# Patient Record
Sex: FEMALE | Race: WHITE | NOT HISPANIC OR LATINO | Employment: OTHER | URBAN - METROPOLITAN AREA
[De-identification: names, ages, dates, MRNs, and addresses within clinical notes are randomized per-mention and may not be internally consistent; named-entity substitution may affect disease eponyms.]

---

## 2017-09-01 ENCOUNTER — ESTABLISHED PATIENT (OUTPATIENT)
Dept: URBAN - METROPOLITAN AREA CLINIC 39 | Facility: CLINIC | Age: 82
End: 2017-09-01

## 2017-09-01 DIAGNOSIS — H00.012: ICD-10-CM

## 2017-09-01 PROCEDURE — 1036F TOBACCO NON-USER: CPT

## 2017-09-01 PROCEDURE — 92012 INTRM OPH EXAM EST PATIENT: CPT

## 2017-09-01 PROCEDURE — G8428 CUR MEDS NOT DOCUMENT: HCPCS

## 2017-09-01 PROCEDURE — G8785 BP SCRN NO PERF AT INTERVAL: HCPCS

## 2017-09-01 ASSESSMENT — TONOMETRY
OS_IOP_MMHG: 19
OD_IOP_MMHG: 18

## 2017-09-01 ASSESSMENT — VISUAL ACUITY
OS_SC: 20/25
OD_SC: 20/25+1

## 2017-10-12 ENCOUNTER — EST. PATIENT EMERGENCY (OUTPATIENT)
Dept: URBAN - METROPOLITAN AREA CLINIC 39 | Facility: CLINIC | Age: 82
End: 2017-10-12

## 2017-10-12 DIAGNOSIS — H00.012: ICD-10-CM

## 2017-10-12 PROCEDURE — 1036F TOBACCO NON-USER: CPT

## 2017-10-12 PROCEDURE — G8785 BP SCRN NO PERF AT INTERVAL: HCPCS

## 2017-10-12 PROCEDURE — G8428 CUR MEDS NOT DOCUMENT: HCPCS

## 2017-10-12 PROCEDURE — 99212 OFFICE O/P EST SF 10 MIN: CPT

## 2017-10-12 RX ORDER — DOXYCYCLINE HYCLATE 100MG 100 MG/1
1 CAPSULE ORAL TWICE A DAY
Start: 2017-10-12 | End: 2017-10-26

## 2017-10-12 ASSESSMENT — VISUAL ACUITY
OD_SC: 20/30-2
OS_SC: 20/25

## 2017-10-12 ASSESSMENT — TONOMETRY
OD_IOP_MMHG: 17
OS_IOP_MMHG: 16

## 2017-12-13 ENCOUNTER — PREPPED CHART (OUTPATIENT)
Dept: URBAN - METROPOLITAN AREA CLINIC 39 | Facility: CLINIC | Age: 82
End: 2017-12-13

## 2018-03-08 ENCOUNTER — ESTABLISHED PATIENT (OUTPATIENT)
Dept: URBAN - METROPOLITAN AREA CLINIC 39 | Facility: CLINIC | Age: 83
End: 2018-03-08

## 2018-03-08 VITALS
RESPIRATION RATE: 14 BRPM | DIASTOLIC BLOOD PRESSURE: 76 MMHG | HEIGHT: 55 IN | SYSTOLIC BLOOD PRESSURE: 118 MMHG | HEART RATE: 64 BPM

## 2018-03-08 DIAGNOSIS — H01.004: ICD-10-CM

## 2018-03-08 DIAGNOSIS — H40.053: ICD-10-CM

## 2018-03-08 DIAGNOSIS — H43.393: ICD-10-CM

## 2018-03-08 DIAGNOSIS — H01.001: ICD-10-CM

## 2018-03-08 PROCEDURE — 92015 DETERMINE REFRACTIVE STATE: CPT

## 2018-03-08 PROCEDURE — 92133 CPTRZD OPH DX IMG PST SGM ON: CPT

## 2018-03-08 PROCEDURE — 92014 COMPRE OPH EXAM EST PT 1/>: CPT

## 2018-03-08 PROCEDURE — G8783 BP SCRN PERF REC INTERVAL: HCPCS

## 2018-03-08 PROCEDURE — 1036F TOBACCO NON-USER: CPT

## 2018-03-08 PROCEDURE — 76514 ECHO EXAM OF EYE THICKNESS: CPT

## 2018-03-08 PROCEDURE — G8428 CUR MEDS NOT DOCUMENT: HCPCS

## 2018-03-08 ASSESSMENT — VISUAL ACUITY
OD_CC: J1
OS_CC: J1
OS_SC: J6
OD_SC: J6
OD_SC: 20/30
OS_SC: 20/30+2

## 2018-03-08 ASSESSMENT — TONOMETRY
OS_IOP_MMHG: 23
OD_IOP_MMHG: 24

## 2018-05-24 ENCOUNTER — EST. PATIENT EMERGENCY (OUTPATIENT)
Dept: URBAN - METROPOLITAN AREA CLINIC 39 | Facility: CLINIC | Age: 83
End: 2018-05-24

## 2018-05-24 DIAGNOSIS — H00.026: ICD-10-CM

## 2018-05-24 DIAGNOSIS — H01.001: ICD-10-CM

## 2018-05-24 DIAGNOSIS — H01.004: ICD-10-CM

## 2018-05-24 PROCEDURE — 1036F TOBACCO NON-USER: CPT

## 2018-05-24 PROCEDURE — G8785 BP SCRN NO PERF AT INTERVAL: HCPCS

## 2018-05-24 PROCEDURE — G8427 DOCREV CUR MEDS BY ELIG CLIN: HCPCS

## 2018-05-24 PROCEDURE — 4040F PNEUMOC VAC/ADMIN/RCVD: CPT

## 2018-05-24 PROCEDURE — 99213 OFFICE O/P EST LOW 20 MIN: CPT

## 2018-05-24 ASSESSMENT — TONOMETRY
OD_IOP_MMHG: 22
OS_IOP_MMHG: 21

## 2018-05-24 ASSESSMENT — VISUAL ACUITY
OD_SC: 20/30-1
OS_SC: 20/30-2

## 2018-06-14 ENCOUNTER — FOLLOW UP (OUTPATIENT)
Dept: URBAN - METROPOLITAN AREA CLINIC 39 | Facility: CLINIC | Age: 83
End: 2018-06-14

## 2018-06-14 DIAGNOSIS — H02.836: ICD-10-CM

## 2018-06-14 DIAGNOSIS — H01.004: ICD-10-CM

## 2018-06-14 DIAGNOSIS — H01.001: ICD-10-CM

## 2018-06-14 DIAGNOSIS — H00.026: ICD-10-CM

## 2018-06-14 PROCEDURE — 92012 INTRM OPH EXAM EST PATIENT: CPT

## 2018-06-14 PROCEDURE — G8427 DOCREV CUR MEDS BY ELIG CLIN: HCPCS

## 2018-06-14 PROCEDURE — G8785 BP SCRN NO PERF AT INTERVAL: HCPCS

## 2018-06-14 PROCEDURE — 1036F TOBACCO NON-USER: CPT

## 2018-06-14 RX ORDER — DOXYCYCLINE HYCLATE 50 MG/1: TABLET, FILM COATED ORAL

## 2018-06-14 ASSESSMENT — TONOMETRY
OS_IOP_MMHG: 20
OD_IOP_MMHG: 19

## 2018-06-14 ASSESSMENT — VISUAL ACUITY
OS_SC: 20/25-1
OD_SC: 20/30-1

## 2018-07-18 ENCOUNTER — CONSULT (OUTPATIENT)
Dept: URBAN - METROPOLITAN AREA CLINIC 39 | Facility: CLINIC | Age: 83
End: 2018-07-18

## 2018-07-18 DIAGNOSIS — H02.836: ICD-10-CM

## 2018-07-18 DIAGNOSIS — H02.833: ICD-10-CM

## 2018-07-18 PROCEDURE — 1036F TOBACCO NON-USER: CPT

## 2018-07-18 PROCEDURE — 4040F PNEUMOC VAC/ADMIN/RCVD: CPT

## 2018-07-18 PROCEDURE — 99213 OFFICE O/P EST LOW 20 MIN: CPT

## 2018-07-18 PROCEDURE — G8428 CUR MEDS NOT DOCUMENT: HCPCS

## 2018-07-18 PROCEDURE — 92285 EXTERNAL OCULAR PHOTOGRAPHY: CPT

## 2018-07-18 PROCEDURE — G8785 BP SCRN NO PERF AT INTERVAL: HCPCS

## 2018-07-18 ASSESSMENT — VISUAL ACUITY
OU_SC: 20/20-2
OD_SC: 20/25+2
OS_SC: 20/25-2

## 2018-07-23 ENCOUNTER — TECH ONLY (OUTPATIENT)
Dept: URBAN - METROPOLITAN AREA CLINIC 39 | Facility: CLINIC | Age: 83
End: 2018-07-23

## 2018-07-23 DIAGNOSIS — H02.833: ICD-10-CM

## 2018-07-23 DIAGNOSIS — H02.836: ICD-10-CM

## 2018-07-23 PROCEDURE — 92082 INTERMEDIATE VISUAL FIELD XM: CPT

## 2018-07-23 PROCEDURE — 99211T TECH SERVICE

## 2019-11-22 NOTE — PATIENT DISCUSSION
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

## 2020-02-28 NOTE — PATIENT DISCUSSION
Also, please do not hesitate to call us if you have any concerns not addressed by this information. Please call [phone] and we will do everything we can to help you during this period.

## 2020-03-25 ENCOUNTER — EST. PATIENT EMERGENCY (OUTPATIENT)
Dept: URBAN - METROPOLITAN AREA CLINIC 39 | Facility: CLINIC | Age: 85
End: 2020-03-25

## 2020-03-25 DIAGNOSIS — S05.02XA: ICD-10-CM

## 2020-03-25 DIAGNOSIS — H02.88B: ICD-10-CM

## 2020-03-25 DIAGNOSIS — H04.123: ICD-10-CM

## 2020-03-25 DIAGNOSIS — H02.88A: ICD-10-CM

## 2020-03-25 PROCEDURE — 92012 INTRM OPH EXAM EST PATIENT: CPT

## 2020-03-25 PROCEDURE — 92071 CONTACT LENS FITTING FOR TX: CPT

## 2020-03-25 RX ORDER — AMOXICILLIN 500 MG/1: 1 CAPSULE ORAL

## 2020-03-25 ASSESSMENT — TONOMETRY
OD_IOP_MMHG: 17
OS_IOP_MMHG: 17

## 2020-03-25 ASSESSMENT — VISUAL ACUITY
OD_SC: 20/25-1
OS_SC: 20/25-2

## 2020-03-30 ENCOUNTER — FOLLOW UP (OUTPATIENT)
Dept: URBAN - METROPOLITAN AREA CLINIC 39 | Facility: CLINIC | Age: 85
End: 2020-03-30

## 2020-03-30 DIAGNOSIS — H04.123: ICD-10-CM

## 2020-03-30 DIAGNOSIS — H01.02B: ICD-10-CM

## 2020-03-30 DIAGNOSIS — H01.02A: ICD-10-CM

## 2020-03-30 DIAGNOSIS — S05.02XD: ICD-10-CM

## 2020-03-30 DIAGNOSIS — H02.88A: ICD-10-CM

## 2020-03-30 DIAGNOSIS — H02.88B: ICD-10-CM

## 2020-03-30 PROCEDURE — 92012 INTRM OPH EXAM EST PATIENT: CPT

## 2020-03-30 ASSESSMENT — VISUAL ACUITY
OS_SC: 20/40+2
OD_SC: 20/30

## 2020-05-20 ENCOUNTER — ESTABLISHED COMPREHENSIVE EXAM (OUTPATIENT)
Dept: URBAN - METROPOLITAN AREA CLINIC 39 | Facility: CLINIC | Age: 85
End: 2020-05-20

## 2020-05-20 DIAGNOSIS — H02.88B: ICD-10-CM

## 2020-05-20 DIAGNOSIS — H43.813: ICD-10-CM

## 2020-05-20 DIAGNOSIS — H02.88A: ICD-10-CM

## 2020-05-20 DIAGNOSIS — H04.123: ICD-10-CM

## 2020-05-20 PROCEDURE — 92015 DETERMINE REFRACTIVE STATE: CPT

## 2020-05-20 PROCEDURE — 92014 COMPRE OPH EXAM EST PT 1/>: CPT

## 2020-05-20 ASSESSMENT — TONOMETRY
OS_IOP_MMHG: 12
OD_IOP_MMHG: 13

## 2020-05-20 ASSESSMENT — VISUAL ACUITY
OD_SC: J2
OS_SC: 20/30
OU_SC: J1
OU_SC: 20/25
OD_SC: 20/30+1
OS_SC: J3

## 2020-06-16 ENCOUNTER — CONSULT (OUTPATIENT)
Dept: URBAN - METROPOLITAN AREA CLINIC 39 | Facility: CLINIC | Age: 85
End: 2020-06-16

## 2020-06-16 VITALS — SYSTOLIC BLOOD PRESSURE: 167 MMHG | DIASTOLIC BLOOD PRESSURE: 76 MMHG | HEIGHT: 55 IN | HEART RATE: 64 BPM

## 2020-06-16 DIAGNOSIS — H43.813: ICD-10-CM

## 2020-06-16 DIAGNOSIS — H34.8322: ICD-10-CM

## 2020-06-16 DIAGNOSIS — H35.62: ICD-10-CM

## 2020-06-16 PROCEDURE — 92201 OPSCPY EXTND RTA DRAW UNI/BI: CPT

## 2020-06-16 PROCEDURE — 92134 CPTRZ OPH DX IMG PST SGM RTA: CPT

## 2020-06-16 PROCEDURE — 92014 COMPRE OPH EXAM EST PT 1/>: CPT

## 2020-06-16 ASSESSMENT — TONOMETRY
OD_IOP_MMHG: 14
OS_IOP_MMHG: 15

## 2020-06-16 ASSESSMENT — VISUAL ACUITY
OD_SC: 20/25-1
OD_SC: J3
OS_SC: J4
OS_SC: 20/25-2

## 2020-08-17 ENCOUNTER — RETINA CONSULT (OUTPATIENT)
Dept: URBAN - METROPOLITAN AREA CLINIC 39 | Facility: CLINIC | Age: 85
End: 2020-08-17

## 2020-08-17 DIAGNOSIS — H35.3131: ICD-10-CM

## 2020-08-17 DIAGNOSIS — H35.723: ICD-10-CM

## 2020-08-17 DIAGNOSIS — H34.8322: ICD-10-CM

## 2020-08-17 DIAGNOSIS — H35.372: ICD-10-CM

## 2020-08-17 DIAGNOSIS — H35.363: ICD-10-CM

## 2020-08-17 DIAGNOSIS — H43.813: ICD-10-CM

## 2020-08-17 PROCEDURE — 92014 COMPRE OPH EXAM EST PT 1/>: CPT

## 2020-08-17 PROCEDURE — 92134 CPTRZ OPH DX IMG PST SGM RTA: CPT

## 2020-08-17 PROCEDURE — 92273 FULL FIELD ERG W/I&R: CPT

## 2020-08-17 PROCEDURE — 92242 FLUORESCEIN&ICG ANGIOGRAPHY: CPT

## 2020-08-17 ASSESSMENT — VISUAL ACUITY
OS_SC: 20/50
OD_PH: 20/30-2
OD_SC: 20/40

## 2020-08-17 ASSESSMENT — TONOMETRY
OD_IOP_MMHG: 19
OS_IOP_MMHG: 19

## 2020-11-16 ENCOUNTER — ESTABLISHED COMPREHENSIVE EXAM (OUTPATIENT)
Dept: URBAN - METROPOLITAN AREA CLINIC 39 | Facility: CLINIC | Age: 85
End: 2020-11-16

## 2020-11-16 DIAGNOSIS — H35.372: ICD-10-CM

## 2020-11-16 DIAGNOSIS — H43.813: ICD-10-CM

## 2020-11-16 DIAGNOSIS — H35.3131: ICD-10-CM

## 2020-11-16 DIAGNOSIS — H35.363: ICD-10-CM

## 2020-11-16 DIAGNOSIS — H34.8322: ICD-10-CM

## 2020-11-16 DIAGNOSIS — H35.723: ICD-10-CM

## 2020-11-16 PROCEDURE — 92242 FLUORESCEIN&ICG ANGIOGRAPHY: CPT

## 2020-11-16 PROCEDURE — 92014 COMPRE OPH EXAM EST PT 1/>: CPT

## 2020-11-16 PROCEDURE — 92202 OPSCPY EXTND ON/MAC DRAW: CPT

## 2020-11-16 PROCEDURE — 92134 CPTRZ OPH DX IMG PST SGM RTA: CPT

## 2020-11-16 ASSESSMENT — VISUAL ACUITY
OD_PH: 20/30+2
OD_SC: 20/40+2
OS_SC: 20/40+1
OS_PH: 20/25-1

## 2020-11-16 ASSESSMENT — TONOMETRY
OS_IOP_MMHG: 20
OD_IOP_MMHG: 18

## 2021-04-19 ENCOUNTER — ESTABLISHED COMPREHENSIVE EXAM (OUTPATIENT)
Dept: URBAN - METROPOLITAN AREA CLINIC 39 | Facility: CLINIC | Age: 86
End: 2021-04-19

## 2021-04-19 DIAGNOSIS — H34.8322: ICD-10-CM

## 2021-04-19 DIAGNOSIS — H43.813: ICD-10-CM

## 2021-04-19 DIAGNOSIS — H35.372: ICD-10-CM

## 2021-04-19 DIAGNOSIS — H35.723: ICD-10-CM

## 2021-04-19 DIAGNOSIS — H35.363: ICD-10-CM

## 2021-04-19 DIAGNOSIS — H35.3131: ICD-10-CM

## 2021-04-19 PROCEDURE — 92242 FLUORESCEIN&ICG ANGIOGRAPHY: CPT

## 2021-04-19 PROCEDURE — 92273 FULL FIELD ERG W/I&R: CPT

## 2021-04-19 PROCEDURE — 92134 CPTRZ OPH DX IMG PST SGM RTA: CPT

## 2021-04-19 PROCEDURE — 99214 OFFICE O/P EST MOD 30 MIN: CPT

## 2021-04-19 PROCEDURE — 92202 OPSCPY EXTND ON/MAC DRAW: CPT

## 2021-04-19 ASSESSMENT — TONOMETRY
OS_IOP_MMHG: 16
OD_IOP_MMHG: 15

## 2021-04-19 ASSESSMENT — VISUAL ACUITY
OD_PH: 20/30-1
OD_SC: 20/40-2
OS_SC: 20/30-1

## 2021-10-20 ENCOUNTER — ESTABLISHED COMPREHENSIVE EXAM (OUTPATIENT)
Dept: URBAN - METROPOLITAN AREA CLINIC 39 | Facility: CLINIC | Age: 86
End: 2021-10-20

## 2021-10-20 DIAGNOSIS — H34.8322: ICD-10-CM

## 2021-10-20 DIAGNOSIS — H35.363: ICD-10-CM

## 2021-10-20 DIAGNOSIS — H02.88A: ICD-10-CM

## 2021-10-20 DIAGNOSIS — S05.02XD: ICD-10-CM

## 2021-10-20 DIAGNOSIS — H35.30: ICD-10-CM

## 2021-10-20 DIAGNOSIS — H01.02A: ICD-10-CM

## 2021-10-20 DIAGNOSIS — H35.3131: ICD-10-CM

## 2021-10-20 DIAGNOSIS — H01.02B: ICD-10-CM

## 2021-10-20 DIAGNOSIS — H43.813: ICD-10-CM

## 2021-10-20 DIAGNOSIS — H35.723: ICD-10-CM

## 2021-10-20 DIAGNOSIS — H35.372: ICD-10-CM

## 2021-10-20 DIAGNOSIS — H02.88B: ICD-10-CM

## 2021-10-20 DIAGNOSIS — H04.123: ICD-10-CM

## 2021-10-20 DIAGNOSIS — Z96.1: ICD-10-CM

## 2021-10-20 PROCEDURE — 92015 DETERMINE REFRACTIVE STATE: CPT

## 2021-10-20 PROCEDURE — 92014 COMPRE OPH EXAM EST PT 1/>: CPT

## 2021-10-20 ASSESSMENT — TONOMETRY
OD_IOP_MMHG: 16
OS_IOP_MMHG: 17

## 2021-10-20 ASSESSMENT — VISUAL ACUITY
OU_CC: J1
OS_SC: 20/50+2
OS_CC: J1+
OD_CC: J1
OU_SC: 20/40-1
OU_SC: J1
OD_SC: J2
OS_SC: J3
OD_SC: 20/50+2

## 2022-04-20 ENCOUNTER — COMPREHENSIVE EXAM (OUTPATIENT)
Dept: URBAN - METROPOLITAN AREA CLINIC 39 | Facility: CLINIC | Age: 87
End: 2022-04-20

## 2022-04-20 DIAGNOSIS — H35.3131: ICD-10-CM

## 2022-04-20 DIAGNOSIS — Z96.1: ICD-10-CM

## 2022-04-20 DIAGNOSIS — H43.813: ICD-10-CM

## 2022-04-20 DIAGNOSIS — H35.372: ICD-10-CM

## 2022-04-20 DIAGNOSIS — H35.363: ICD-10-CM

## 2022-04-20 DIAGNOSIS — H34.8322: ICD-10-CM

## 2022-04-20 DIAGNOSIS — H35.723: ICD-10-CM

## 2022-04-20 PROCEDURE — 92242 FLUORESCEIN&ICG ANGIOGRAPHY: CPT

## 2022-04-20 PROCEDURE — 92273 FULL FIELD ERG W/I&R: CPT

## 2022-04-20 PROCEDURE — 99214 OFFICE O/P EST MOD 30 MIN: CPT

## 2022-04-20 PROCEDURE — 92134 CPTRZ OPH DX IMG PST SGM RTA: CPT

## 2022-04-20 ASSESSMENT — TONOMETRY
OD_IOP_MMHG: 23
OS_IOP_MMHG: 21

## 2022-04-20 ASSESSMENT — VISUAL ACUITY
OD_PH: 20/30-2
OS_SC: 20/30
OD_SC: 20/40

## 2022-09-21 ENCOUNTER — COMPREHENSIVE EXAM (OUTPATIENT)
Dept: URBAN - METROPOLITAN AREA CLINIC 39 | Facility: CLINIC | Age: 87
End: 2022-09-21

## 2022-09-21 DIAGNOSIS — H35.363: ICD-10-CM

## 2022-09-21 DIAGNOSIS — H35.372: ICD-10-CM

## 2022-09-21 DIAGNOSIS — H04.123: ICD-10-CM

## 2022-09-21 DIAGNOSIS — H34.8322: ICD-10-CM

## 2022-09-21 DIAGNOSIS — H40.053: ICD-10-CM

## 2022-09-21 DIAGNOSIS — H35.723: ICD-10-CM

## 2022-09-21 DIAGNOSIS — H43.813: ICD-10-CM

## 2022-09-21 DIAGNOSIS — H35.3132: ICD-10-CM

## 2022-09-21 PROCEDURE — 99214 OFFICE O/P EST MOD 30 MIN: CPT

## 2022-09-21 PROCEDURE — 92250 FUNDUS PHOTOGRAPHY W/I&R: CPT

## 2022-09-21 PROCEDURE — 92273 FULL FIELD ERG W/I&R: CPT

## 2022-09-21 ASSESSMENT — TONOMETRY
OS_IOP_MMHG: 16
OD_IOP_MMHG: 16

## 2022-09-21 ASSESSMENT — VISUAL ACUITY
OD_CC: 20/25-1
OS_CC: 20/25

## 2022-12-27 ENCOUNTER — COMPREHENSIVE EXAM (OUTPATIENT)
Dept: URBAN - METROPOLITAN AREA CLINIC 39 | Facility: CLINIC | Age: 87
End: 2022-12-27

## 2022-12-27 PROCEDURE — 92015 DETERMINE REFRACTIVE STATE: CPT

## 2022-12-27 PROCEDURE — 92014 COMPRE OPH EXAM EST PT 1/>: CPT

## 2022-12-27 PROCEDURE — 92134 CPTRZ OPH DX IMG PST SGM RTA: CPT

## 2022-12-27 ASSESSMENT — VISUAL ACUITY
OD_SC: J3
OU_SC: J1+
OS_SC: J3
OS_CC: 20/20-1
OD_CC: 20/20
OU_CC: 20/20-1

## 2022-12-27 ASSESSMENT — TONOMETRY
OD_IOP_MMHG: 18
OS_IOP_MMHG: 19

## 2024-02-15 ENCOUNTER — COMPREHENSIVE EXAM (OUTPATIENT)
Dept: URBAN - METROPOLITAN AREA CLINIC 39 | Facility: CLINIC | Age: 89
End: 2024-02-15

## 2024-02-15 DIAGNOSIS — H35.363: ICD-10-CM

## 2024-02-15 DIAGNOSIS — H40.053: ICD-10-CM

## 2024-02-15 DIAGNOSIS — Z96.1: ICD-10-CM

## 2024-02-15 DIAGNOSIS — H02.88A: ICD-10-CM

## 2024-02-15 DIAGNOSIS — H35.372: ICD-10-CM

## 2024-02-15 DIAGNOSIS — H43.813: ICD-10-CM

## 2024-02-15 DIAGNOSIS — H34.8322: ICD-10-CM

## 2024-02-15 DIAGNOSIS — H35.30: ICD-10-CM

## 2024-02-15 DIAGNOSIS — H04.123: ICD-10-CM

## 2024-02-15 DIAGNOSIS — H35.3132: ICD-10-CM

## 2024-02-15 DIAGNOSIS — H02.88B: ICD-10-CM

## 2024-02-15 DIAGNOSIS — H35.723: ICD-10-CM

## 2024-02-15 PROCEDURE — 92014 COMPRE OPH EXAM EST PT 1/>: CPT

## 2024-02-15 ASSESSMENT — TONOMETRY
OD_IOP_MMHG: 20
OS_IOP_MMHG: 21

## 2024-02-15 ASSESSMENT — VISUAL ACUITY
OS_CC: J2
OD_SC: 20/30+1
OU_CC: J1
OS_SC: 20/25
OU_SC: 20/20-2
OD_CC: J2

## 2025-02-17 ENCOUNTER — COMPREHENSIVE EXAM (OUTPATIENT)
Age: 89
End: 2025-02-17

## 2025-02-17 DIAGNOSIS — H43.813: ICD-10-CM

## 2025-02-17 DIAGNOSIS — H35.372: ICD-10-CM

## 2025-02-17 DIAGNOSIS — H04.123: ICD-10-CM

## 2025-02-17 DIAGNOSIS — Z96.1: ICD-10-CM

## 2025-02-17 DIAGNOSIS — H35.3132: ICD-10-CM

## 2025-02-17 DIAGNOSIS — H35.30: ICD-10-CM

## 2025-02-17 DIAGNOSIS — H35.723: ICD-10-CM

## 2025-02-17 DIAGNOSIS — H35.363: ICD-10-CM

## 2025-02-17 DIAGNOSIS — H40.053: ICD-10-CM

## 2025-02-17 DIAGNOSIS — H34.8322: ICD-10-CM

## 2025-02-17 DIAGNOSIS — H02.88B: ICD-10-CM

## 2025-02-17 DIAGNOSIS — H02.88A: ICD-10-CM

## 2025-02-17 PROCEDURE — 92014 COMPRE OPH EXAM EST PT 1/>: CPT
